# Patient Record
Sex: FEMALE | Race: WHITE | Employment: OTHER | URBAN - METROPOLITAN AREA
[De-identification: names, ages, dates, MRNs, and addresses within clinical notes are randomized per-mention and may not be internally consistent; named-entity substitution may affect disease eponyms.]

---

## 2023-07-29 ENCOUNTER — OFFICE VISIT (OUTPATIENT)
Dept: FAMILY MEDICINE CLINIC | Facility: CLINIC | Age: 68
End: 2023-07-29
Payer: MEDICARE

## 2023-07-29 VITALS
HEART RATE: 68 BPM | OXYGEN SATURATION: 98 % | SYSTOLIC BLOOD PRESSURE: 124 MMHG | HEIGHT: 64 IN | BODY MASS INDEX: 22.2 KG/M2 | WEIGHT: 130 LBS | RESPIRATION RATE: 18 BRPM | DIASTOLIC BLOOD PRESSURE: 70 MMHG | TEMPERATURE: 97 F

## 2023-07-29 DIAGNOSIS — J01.00 ACUTE MAXILLARY SINUSITIS, RECURRENCE NOT SPECIFIED: Primary | ICD-10-CM

## 2023-07-29 DIAGNOSIS — R03.0 ELEVATED BLOOD PRESSURE READING: ICD-10-CM

## 2023-07-29 PROBLEM — C96.A: Status: ACTIVE | Noted: 2021-10-07

## 2023-07-29 PROBLEM — H53.459 PERIPHERAL VISION LOSS: Status: ACTIVE | Noted: 2017-06-08

## 2023-07-29 PROBLEM — K57.90 DIVERTICULOSIS: Status: ACTIVE | Noted: 2023-07-29

## 2023-07-29 PROBLEM — R13.10 DYSPHAGIA: Status: ACTIVE | Noted: 2021-10-07

## 2023-07-29 PROCEDURE — 99202 OFFICE O/P NEW SF 15 MIN: CPT | Performed by: NURSE PRACTITIONER

## 2023-07-29 RX ORDER — FAMOTIDINE 10 MG
TABLET ORAL AS DIRECTED
COMMUNITY

## 2023-07-29 RX ORDER — DOXYCYCLINE HYCLATE 100 MG/1
100 CAPSULE ORAL 2 TIMES DAILY
Qty: 14 CAPSULE | Refills: 0 | Status: SHIPPED | OUTPATIENT
Start: 2023-07-29 | End: 2023-08-05

## 2023-07-29 RX ORDER — LATANOPROST 50 UG/ML
1 SOLUTION/ DROPS OPHTHALMIC NIGHTLY
COMMUNITY

## 2023-07-29 RX ORDER — ERGOCALCIFEROL 1.25 MG/1
50000 CAPSULE ORAL WEEKLY
COMMUNITY

## 2023-07-29 RX ORDER — LEVOTHYROXINE SODIUM 0.1 MG/1
100 TABLET ORAL DAILY
COMMUNITY
Start: 2023-04-03

## 2023-07-29 NOTE — PATIENT INSTRUCTIONS
PLAN: Doxycycline, take as directed. Finish all the medication even if you feel better. Avoid sun exposure and wear sunscreen during use to help prevent skin burning due to drug use. May continue Allegra daily for the next week or two. Salt water gargles (1 tsp. Salt in 6 oz lukewarm water), use several times daily to help remove post nasal drip. Saline nasal spray to nostrils if needed to help remove drainage or congestion in nose. Hydrate! (cold or hot based on comfort). Drink lots of water or other non dehydrating liquids to help with illness. Hand washing-use hand  or wash hands frequently, cover your cough or sneeze, do not share towels or drinks with others. Hot steam inhalation, Vicks vapor rub to chest for sleep. May use Tylenol or Ibuprofen over the counter for pain/comfort if not contraindicated. Follow up in 2 weeks with primary care if symptoms persist, or sooner if worsening symptoms. Seek immediate care if inability to swallow or breathe.